# Patient Record
Sex: FEMALE | Race: WHITE | Employment: FULL TIME | URBAN - METROPOLITAN AREA
[De-identification: names, ages, dates, MRNs, and addresses within clinical notes are randomized per-mention and may not be internally consistent; named-entity substitution may affect disease eponyms.]

---
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
## 2022-11-19 PROBLEM — Z11.1 ENCOUNTER FOR PPD SKIN TEST READING: Status: RESOLVED | Noted: 2019-08-26 | Resolved: 2022-11-19

## 2025-06-12 ENCOUNTER — OFFICE VISIT (OUTPATIENT)
Dept: PRIMARY CARE CLINIC | Age: 25
End: 2025-06-12
Payer: COMMERCIAL

## 2025-06-12 VITALS
WEIGHT: 204 LBS | OXYGEN SATURATION: 99 % | SYSTOLIC BLOOD PRESSURE: 128 MMHG | HEIGHT: 68 IN | BODY MASS INDEX: 30.92 KG/M2 | HEART RATE: 103 BPM | DIASTOLIC BLOOD PRESSURE: 70 MMHG

## 2025-06-12 DIAGNOSIS — Z76.89 ENCOUNTER TO ESTABLISH CARE: ICD-10-CM

## 2025-06-12 DIAGNOSIS — Z23 NEED FOR VACCINATION: ICD-10-CM

## 2025-06-12 DIAGNOSIS — Z00.00 ANNUAL PHYSICAL EXAM: ICD-10-CM

## 2025-06-12 DIAGNOSIS — Z23 NEED FOR VACCINATION: Primary | ICD-10-CM

## 2025-06-12 PROCEDURE — 90471 IMMUNIZATION ADMIN: CPT | Performed by: PHYSICIAN ASSISTANT

## 2025-06-12 PROCEDURE — 90715 TDAP VACCINE 7 YRS/> IM: CPT | Performed by: PHYSICIAN ASSISTANT

## 2025-06-12 PROCEDURE — 99385 PREV VISIT NEW AGE 18-39: CPT | Performed by: PHYSICIAN ASSISTANT

## 2025-06-12 SDOH — HEALTH STABILITY: PHYSICAL HEALTH: ON AVERAGE, HOW MANY MINUTES DO YOU ENGAGE IN EXERCISE AT THIS LEVEL?: 60 MIN

## 2025-06-12 SDOH — HEALTH STABILITY: PHYSICAL HEALTH: ON AVERAGE, HOW MANY DAYS PER WEEK DO YOU ENGAGE IN MODERATE TO STRENUOUS EXERCISE (LIKE A BRISK WALK)?: 3 DAYS

## 2025-06-12 SDOH — ECONOMIC STABILITY: FOOD INSECURITY: WITHIN THE PAST 12 MONTHS, THE FOOD YOU BOUGHT JUST DIDN'T LAST AND YOU DIDN'T HAVE MONEY TO GET MORE.: NEVER TRUE

## 2025-06-12 SDOH — ECONOMIC STABILITY: FOOD INSECURITY: WITHIN THE PAST 12 MONTHS, YOU WORRIED THAT YOUR FOOD WOULD RUN OUT BEFORE YOU GOT MONEY TO BUY MORE.: NEVER TRUE

## 2025-06-12 ASSESSMENT — PATIENT HEALTH QUESTIONNAIRE - PHQ9
SUM OF ALL RESPONSES TO PHQ QUESTIONS 1-9: 0
2. FEELING DOWN, DEPRESSED OR HOPELESS: NOT AT ALL
DEPRESSION UNABLE TO ASSESS: FUNCTIONAL CAPACITY MOTIVATION LIMITS ACCURACY
1. LITTLE INTEREST OR PLEASURE IN DOING THINGS: NOT AT ALL
SUM OF ALL RESPONSES TO PHQ QUESTIONS 1-9: 0

## 2025-06-12 ASSESSMENT — ENCOUNTER SYMPTOMS
CONSTIPATION: 0
VOMITING: 0
COUGH: 0
DIARRHEA: 0
ABDOMINAL PAIN: 0
SORE THROAT: 0
ABDOMINAL DISTENTION: 0
CHEST TIGHTNESS: 0
NAUSEA: 0
SHORTNESS OF BREATH: 0

## 2025-06-12 NOTE — PROGRESS NOTES
[facility]  [facility]  [address] [facility] [id number]  Dept: [phone]    Magi Burton is a 25 y.o. female New patient, who presents today for her medical conditions/complaints as noted below.      Chief Complaint   Patient presents with    New Patient     Patient states they the following concerns est care, blood work, vaccines, etc        HPI:     History of Present Illness  The patient is a pleasant 25-year-old female who presents today with concerns of establishing care.    She has no history of chronic conditions or medication use. She reports no specific health concerns at present. Her last blood work was conducted approximately 1.5 years ago, which yielded normal results. She is due for a physical examination as part of her school requirements. Her Tdap booster has  and needs to be updated. She also requires a two-step TB test but is unable to undergo it due to her work schedule. She is open to the QuantiFERON test. Her other immunizations are up-to-date, including hepatitis B and MMR, as she recently graduated from nursing school. She had to receive one additional dose of MMR vaccine due to low levels. She is scheduled to start her FNP program on 2025. She is current with her OB/GYN follow-ups. She has received her influenza vaccine at her workplace.    She reports no signs of illness such as cough or congestion, fevers, chills, unexpected weight change, muscle or joint pains, abdominal pains, nausea, vomiting, constipation, diarrhea, urinary symptoms, dizziness, headaches, numbness, depression, anxiety, or sleep issues. She feels physically capable of performing her job duties.    SOCIAL HISTORY  She works as a nurse in a hospital.    FAMILY HISTORY  Her father has a history of high cholesterol.      Reviewed prior notes: None  Reviewed previous: Labs    No results found for: \"LDL\", \"HDL\"    (goal LDL is <100)   No results found for:

## 2025-06-14 LAB
QUANTI TB1 MINUS NIL: 0.01 IU/ML (ref 0–0.34)
QUANTI TB2 MINUS NIL: 0.01 IU/ML (ref 0–0.34)
QUANTIFERON MITOGEN MINUS NIL: 9.97 IU/ML
QUANTIFERON NIL: 0.03 IU/ML
QUANTIFERON TB INTERPRETATION: NEGATIVE IU/ML

## 2025-06-16 ENCOUNTER — RESULTS FOLLOW-UP (OUTPATIENT)
Dept: PRIMARY CARE CLINIC | Age: 25
End: 2025-06-16